# Patient Record
Sex: FEMALE | Race: BLACK OR AFRICAN AMERICAN | NOT HISPANIC OR LATINO | URBAN - METROPOLITAN AREA
[De-identification: names, ages, dates, MRNs, and addresses within clinical notes are randomized per-mention and may not be internally consistent; named-entity substitution may affect disease eponyms.]

---

## 2024-10-16 ENCOUNTER — LAB (OUTPATIENT)
Dept: LAB | Facility: LAB | Age: 64
End: 2024-10-16
Payer: MEDICARE

## 2024-10-16 ENCOUNTER — OFFICE VISIT (OUTPATIENT)
Dept: RHEUMATOLOGY | Facility: CLINIC | Age: 64
End: 2024-10-16
Payer: MEDICARE

## 2024-10-16 ENCOUNTER — HOSPITAL ENCOUNTER (OUTPATIENT)
Dept: RADIOLOGY | Facility: CLINIC | Age: 64
Discharge: HOME | End: 2024-10-16
Payer: MEDICARE

## 2024-10-16 VITALS — DIASTOLIC BLOOD PRESSURE: 78 MMHG | WEIGHT: 160.5 LBS | SYSTOLIC BLOOD PRESSURE: 138 MMHG

## 2024-10-16 DIAGNOSIS — M85.89 OTHER SPECIFIED DISORDERS OF BONE DENSITY AND STRUCTURE, MULTIPLE SITES: ICD-10-CM

## 2024-10-16 DIAGNOSIS — M19.90 ARTHRITIS: ICD-10-CM

## 2024-10-16 DIAGNOSIS — M19.90 ARTHRITIS: Primary | ICD-10-CM

## 2024-10-16 PROCEDURE — 99204 OFFICE O/P NEW MOD 45 MIN: CPT | Performed by: INTERNAL MEDICINE

## 2024-10-16 PROCEDURE — 73120 X-RAY EXAM OF HAND: CPT | Mod: 50

## 2024-10-16 PROCEDURE — 86225 DNA ANTIBODY NATIVE: CPT

## 2024-10-16 PROCEDURE — 86200 CCP ANTIBODY: CPT

## 2024-10-16 PROCEDURE — 36415 COLL VENOUS BLD VENIPUNCTURE: CPT

## 2024-10-16 PROCEDURE — 99214 OFFICE O/P EST MOD 30 MIN: CPT | Performed by: INTERNAL MEDICINE

## 2024-10-16 PROCEDURE — 73100 X-RAY EXAM OF WRIST: CPT | Mod: 50

## 2024-10-16 PROCEDURE — 86038 ANTINUCLEAR ANTIBODIES: CPT

## 2024-10-16 PROCEDURE — 86140 C-REACTIVE PROTEIN: CPT

## 2024-10-16 PROCEDURE — 86431 RHEUMATOID FACTOR QUANT: CPT

## 2024-10-16 PROCEDURE — 86235 NUCLEAR ANTIGEN ANTIBODY: CPT

## 2024-10-16 RX ORDER — TIZANIDINE 2 MG/1
1 TABLET ORAL 2 TIMES DAILY PRN
COMMUNITY
Start: 2023-12-12 | End: 2024-10-16 | Stop reason: ALTCHOICE

## 2024-10-16 RX ORDER — LATANOPROST 50 UG/ML
1 SOLUTION/ DROPS OPHTHALMIC NIGHTLY
COMMUNITY
Start: 2024-08-30

## 2024-10-16 RX ORDER — CYCLOBENZAPRINE HCL 10 MG
1 TABLET ORAL
COMMUNITY
Start: 2023-11-07 | End: 2024-10-16

## 2024-10-16 RX ORDER — VALACYCLOVIR HYDROCHLORIDE 1 G/1
1 TABLET, FILM COATED ORAL
COMMUNITY
Start: 2024-06-20 | End: 2024-10-16 | Stop reason: ALTCHOICE

## 2024-10-16 NOTE — PROGRESS NOTES
"NP ref per Dr. Abram Mederos for evaluation and treatment of bilat hand pain / disfiguration.  CTR bilat 1996   Pins bilat thumb and right pinky many years ago.  Tylenol with good relief.   Previous Dr. Dickson ( PCP ) disablity for hands but patient does not know the diagnosis.    Poor historian  HPI - she is here for hand pain.  She saw Dr. John \"it wasn't for my hands - it was more for my knees and my muscles\"  She said that he gave her shots \"they made me gain weight, so I stopped going\"  She said he didn't put her on any meds.  Currently, she c/o hand problems \"they don't hurt - that's the way they are - they are that way because they did surgery on them\"  She had htumb and pinky surg because they were \"curved\"  \"they put a stick in them, but they had to take it out\"   She can't remember if she saw rheum when per chart, ortho had recommended that she see rheum\"  She takes tylenol \"when needed\" \"maybe every other day\"  She sometimes gets knee pain if she sits too long.  She denies any hand pain or any other pain.  No numbness/tingling.  No swelling.  Intermittent AM stiffness ?duration.  \"It don't take that long\"  No fever, HA, sicca, mouth sores, raynauds, rash, CP, resp, or GI.    FH - No arthritis.  No CTD  SH - former smoker.  No EtOH or marijuana since 2000.  \"I was detoxed in 1999 for everything I did - all the drugs, crack, alcohol\"    PE  Gen - NAD  Neck - supple, no LAD  CV - RRR no r/m/g  Lungs - CTA  Abd - +BS  Extr - 2+ DP, PT, and rad pulses.  No edema  Skin - no rash.  Scattered hyperpigmented patches  Psych - nl affect  Neuro - nl strength.  Reflexes 2+ symmetric  Msk - mild NT synovitis L 2nd/3rd MCPs.  MCP subluxation and ulnar deviation R>>L.  Difficult to assess synovitis R hand d/t subluxation.  Decr ROM B wrists.  Sl B knee crepitus.  NT and no pain with ROM throughout    A/P - Pt with arthritis.  Hands look like damage c/w RA, although she does not have pain - very occasionally, people do have RA " without pain  2020 hand x-ray reports show severe OA changes and erosive changes.  Will recheck x-rays to eval progression  Labs from 2021 CCP Ab, RF, ESR, CRP all nl.  Recheck RF and CCP and check HALEY  Reviewed recent CBC and CMP - mild anemia  I d/w pt that this does look like RA and recommended treatment to slow further progression and suggested lef - expl risks/benefits.  She refused because she doesn't like to take meds  Check screening DEXA  Follow up 3 mo or sooner PRN    Addendum - she has OP - recommend oral bisphosphonate

## 2024-10-17 ENCOUNTER — HOSPITAL ENCOUNTER (OUTPATIENT)
Dept: RADIOLOGY | Facility: CLINIC | Age: 64
Discharge: HOME | End: 2024-10-17
Payer: MEDICARE

## 2024-10-17 DIAGNOSIS — M85.89 OTHER SPECIFIED DISORDERS OF BONE DENSITY AND STRUCTURE, MULTIPLE SITES: ICD-10-CM

## 2024-10-17 LAB
ANA PATTERN: ABNORMAL
ANA SER QL HEP2 SUBST: POSITIVE
ANA TITR SER IF: ABNORMAL {TITER}
CCP IGG SERPL-ACNC: <1 U/ML
CENTROMERE B AB SER-ACNC: 0.8 AI
CHROMATIN AB SERPL-ACNC: <0.2 AI
CRP SERPL-MCNC: <0.1 MG/DL
DSDNA AB SER-ACNC: 2 IU/ML
ENA JO1 AB SER QL IA: >8 AI
ENA RNP AB SER IA-ACNC: 1.6 AI
ENA SCL70 AB SER QL IA: <0.2 AI
ENA SM AB SER IA-ACNC: 0.3 AI
ENA SM+RNP AB SER QL IA: 0.8 AI
ENA SS-A AB SER IA-ACNC: 1.7 AI
ENA SS-B AB SER IA-ACNC: <0.2 AI
RHEUMATOID FACT SER NEPH-ACNC: <10 IU/ML (ref 0–15)
RIBOSOMAL P AB SER-ACNC: <0.2 AI

## 2024-10-17 PROCEDURE — 77080 DXA BONE DENSITY AXIAL: CPT

## 2024-10-17 PROCEDURE — 77080 DXA BONE DENSITY AXIAL: CPT | Performed by: RADIOLOGY

## 2024-10-29 DIAGNOSIS — M81.0 OSTEOPOROSIS, UNSPECIFIED OSTEOPOROSIS TYPE, UNSPECIFIED PATHOLOGICAL FRACTURE PRESENCE: Primary | ICD-10-CM

## 2024-10-29 RX ORDER — IBANDRONATE SODIUM 150 MG/1
150 TABLET, FILM COATED ORAL
COMMUNITY
End: 2024-10-29 | Stop reason: SDUPTHER

## 2024-10-29 RX ORDER — IBANDRONATE SODIUM 150 MG/1
150 TABLET, FILM COATED ORAL
Qty: 3 TABLET | Refills: 1 | Status: SHIPPED | OUTPATIENT
Start: 2024-10-29

## 2025-01-22 ENCOUNTER — OFFICE VISIT (OUTPATIENT)
Dept: RHEUMATOLOGY | Facility: CLINIC | Age: 65
End: 2025-01-22
Payer: MEDICARE

## 2025-01-22 VITALS — DIASTOLIC BLOOD PRESSURE: 75 MMHG | SYSTOLIC BLOOD PRESSURE: 130 MMHG | WEIGHT: 159 LBS

## 2025-01-22 DIAGNOSIS — M19.90 ARTHRITIS: Primary | ICD-10-CM

## 2025-01-22 DIAGNOSIS — R76.8 JO-1 ANTIBODY POSITIVE: ICD-10-CM

## 2025-01-22 DIAGNOSIS — R76.8 ANA POSITIVE: ICD-10-CM

## 2025-01-22 DIAGNOSIS — M15.4 EROSIVE OSTEOARTHRITIS: ICD-10-CM

## 2025-01-22 PROCEDURE — 99214 OFFICE O/P EST MOD 30 MIN: CPT | Performed by: INTERNAL MEDICINE

## 2025-01-22 RX ORDER — CHOLECALCIFEROL (VITAMIN D3) 25 MCG
1000 TABLET ORAL DAILY
COMMUNITY

## 2025-01-22 NOTE — PROGRESS NOTES
"Recheck  Arthritis  /  OP  ( Ibandronate )  doing well     HPI - she is \"about the same.\"  She doesn't have any pain or swelling.  AM stiffness \"not that long\"  No CP, resp, or GI    PE  NAD  RRR  no r/m/g  CTA  No edema  No obvious synovitis  NT and no pain with ROM throughout    A/P - OA and erosive OA - x-rays c/w erosive OA.  No obvious inflammation at this time.  Pt will call if sx incr  HALEY 1:640/+meliton-1 but no evidence of PM  OP - on ibandronate x 3 mo  Follow up 6 mo or sooner PRN  "

## 2025-03-02 DIAGNOSIS — M81.0 OSTEOPOROSIS, UNSPECIFIED OSTEOPOROSIS TYPE, UNSPECIFIED PATHOLOGICAL FRACTURE PRESENCE: ICD-10-CM

## 2025-03-03 RX ORDER — IBANDRONATE SODIUM 150 MG/1
TABLET, FILM COATED ORAL
Qty: 3 TABLET | Refills: 1 | Status: SHIPPED | OUTPATIENT
Start: 2025-03-03

## 2025-07-23 ENCOUNTER — OFFICE VISIT (OUTPATIENT)
Dept: RHEUMATOLOGY | Facility: CLINIC | Age: 65
End: 2025-07-23
Payer: MEDICARE

## 2025-07-23 VITALS
WEIGHT: 161 LBS | OXYGEN SATURATION: 100 % | SYSTOLIC BLOOD PRESSURE: 149 MMHG | DIASTOLIC BLOOD PRESSURE: 79 MMHG | HEART RATE: 85 BPM

## 2025-07-23 DIAGNOSIS — M81.0 OSTEOPOROSIS, UNSPECIFIED OSTEOPOROSIS TYPE, UNSPECIFIED PATHOLOGICAL FRACTURE PRESENCE: ICD-10-CM

## 2025-07-23 DIAGNOSIS — R76.8 JO-1 ANTIBODY POSITIVE: ICD-10-CM

## 2025-07-23 DIAGNOSIS — M15.4 EROSIVE OSTEOARTHRITIS: ICD-10-CM

## 2025-07-23 DIAGNOSIS — R76.8 ANA POSITIVE: ICD-10-CM

## 2025-07-23 DIAGNOSIS — M19.90 ARTHRITIS: Primary | ICD-10-CM

## 2025-07-23 PROCEDURE — 99214 OFFICE O/P EST MOD 30 MIN: CPT | Performed by: INTERNAL MEDICINE

## 2025-07-23 RX ORDER — IBANDRONATE SODIUM 150 MG/1
150 TABLET, FILM COATED ORAL
Qty: 3 TABLET | Refills: 1 | Status: SHIPPED | OUTPATIENT
Start: 2025-07-23

## 2025-07-23 NOTE — PROGRESS NOTES
"HPI - she is aggravated because she continue to gain wt.  No pain/swelling.  No AM stiffness.  No weakness or dysphagia.  No CP, resp, or GI.  No rash, mouth sores, sicca, or numbness/tingling. ?raynauds \"gray and purple\" then red when she rewarms    PE  NAD  RRR no r/m/g  CTA  No edema  No synovitis  Nl strength throughout    A/P - Pt with OA and erosive OA without obvious inflammation at this time but damge  HALEY 1:640/+meliton-1.  She has sx that are suggestive of raynauds but not currently  OP - on ibandronate x 9 mo  Follow up 6 mo or sooner PRN - check labs at that time  "